# Patient Record
Sex: MALE | Race: WHITE | HISPANIC OR LATINO | Employment: FULL TIME | URBAN - METROPOLITAN AREA
[De-identification: names, ages, dates, MRNs, and addresses within clinical notes are randomized per-mention and may not be internally consistent; named-entity substitution may affect disease eponyms.]

---

## 2018-09-09 ENCOUNTER — OFFICE VISIT (OUTPATIENT)
Dept: URGENT CARE | Facility: CLINIC | Age: 43
End: 2018-09-09

## 2018-09-09 VITALS
BODY MASS INDEX: 30.16 KG/M2 | WEIGHT: 203.6 LBS | RESPIRATION RATE: 18 BRPM | SYSTOLIC BLOOD PRESSURE: 144 MMHG | OXYGEN SATURATION: 99 % | HEART RATE: 74 BPM | DIASTOLIC BLOOD PRESSURE: 98 MMHG | TEMPERATURE: 98.1 F | HEIGHT: 69 IN

## 2018-09-09 DIAGNOSIS — S01.512A LACERATION OF MOUTH, INITIAL ENCOUNTER: ICD-10-CM

## 2018-09-09 DIAGNOSIS — S09.93XA BLUNT TRAUMA OF FACE, INITIAL ENCOUNTER: ICD-10-CM

## 2018-09-09 DIAGNOSIS — S01.81XA CHIN LACERATION, INITIAL ENCOUNTER: ICD-10-CM

## 2018-09-09 DIAGNOSIS — I10 ESSENTIAL HYPERTENSION: ICD-10-CM

## 2018-09-09 PROCEDURE — 12013 RPR F/E/E/N/L/M 2.6-5.0 CM: CPT | Performed by: FAMILY MEDICINE

## 2018-09-09 PROCEDURE — 99202 OFFICE O/P NEW SF 15 MIN: CPT | Mod: 25 | Performed by: FAMILY MEDICINE

## 2018-09-09 RX ORDER — AMOXICILLIN AND CLAVULANATE POTASSIUM 875; 125 MG/1; MG/1
1 TABLET, FILM COATED ORAL 2 TIMES DAILY
Qty: 20 TAB | Refills: 0 | Status: SHIPPED | OUTPATIENT
Start: 2018-09-09 | End: 2018-09-19

## 2018-09-10 ASSESSMENT — ENCOUNTER SYMPTOMS
DOUBLE VISION: 0
BLURRED VISION: 0
DIZZINESS: 0
COUGH: 0
HEADACHES: 0

## 2018-09-10 NOTE — PATIENT INSTRUCTIONS
Wound Care Instructions    * After 24hrs leaving a wound uncovered helps it stay dry and heal.  If the wound isn't in an area that will get dirty or be rubbed by clothing you don't have to cover it.  If you do need to cover it do so with either an adhesive strip (band-aid) or sterile gauze and adhesive tape.  Change the dressing each day to keep the wound clean and dry.    * If you have steri strips (butterfly band-aids) placed, you need to keep those on until they fall off themselves.    * Antibiotic ointments such as Bacitracin or Neosporin may help keep the wound clean and help with scarring.  But DO NOT use these type of ointments with Dermabond as they interfere with wound healing.     * Keep wound dry for 5-7 days so the stitches or Dermabond have time to set .    * Return to the clinic in 7-10 days for stitch removal.    * You may be prescribed oral antibiotics if risk of infection is very high (such as your wound occurred over 12hrs before closure or you are a diabetic)    * You should have a current tetanus vaccination within the past ten years.    * Call your doctor if any of the following things occur as they may be signs of infection...    **the wound opens    **the wound drains pus    **red streaks develop from near the wound    **you have a fever over 101    **the wound site becomes tender or inflamed    AFTERCARE --     Some patients (eg, those with through and through lacerations or risk factors for infection) should undergo reevaluation 48 to 72 hours after repair to ensure proper healing. Patients capable of self-assessment might not need physician evaluation at this time.   Patients with intraoral lip lacerations should be advised as follows:  ?Eat soft foods for two to three days.   ?Rinse the mouth with water after eating.   ?Avoid spicy or salty foods until the wound is healed.  ?Avoid the use of straws (negative pressure may increase ecchymosis or bleeding at the wound site).

## 2018-09-10 NOTE — PROGRESS NOTES
"Subjective:      Emeka Story is a 42 y.o. male who presents with Laceration (x today, laceration on chin)    Patient presents to urgent care with an acute onset of a new problem.  He was playing golf today when he was hit in the face by a golf ball and feels that his tooth punctured the inside of his lip and gum region there is a laceration on the front as well.  There is minimal bleeding no loss of consciousness he can move his jaw without difficulty.  Denies that he feels his teeth are loose.  He feels that his tetanus is not up-to-date.    Patient has a chronic and stable condition of essential hypertension for which he has been on Lopressor his blood pressure has been stable denies any chest pain or shortness of breath.      HPI  Review of Systems   Eyes: Negative for blurred vision and double vision.   Respiratory: Negative for cough.    Neurological: Negative for dizziness and headaches.   All other systems reviewed and are negative.    PMH:  has no past medical history on file.  MEDS:   Current Outpatient Prescriptions:   •  amoxicillin-clavulanate (AUGMENTIN) 875-125 MG Tab, Take 1 Tab by mouth 2 times a day for 10 days., Disp: 20 Tab, Rfl: 0  •  metoprolol (LOPRESSOR) 25 MG TABS, Take 1 Tab by mouth 2 times a day., Disp: 60 Tab, Rfl: 0  •  metoprolol (LOPRESSOR) 25 MG TABS, Take 1 Tab by mouth 2 times a day., Disp: 60 Tab, Rfl: 0  ALLERGIES:   Allergies   Allergen Reactions   • Other Food      bananas     SURGHX:   Past Surgical History:   Procedure Laterality Date   • OTHER ORTHOPEDIC SURGERY       SOCHX:  reports that he has quit smoking. His smoking use included Cigarettes. He quit after 5.00 years of use. He has never used smokeless tobacco. He reports that he drinks alcohol. He reports that he does not use drugs.  FH: Family history was reviewed, no pertinent findings to report     Objective:     /98   Pulse 74   Temp 36.7 °C (98.1 °F)   Resp 18   Ht 1.753 m (5' 9\")   Wt 92.4 kg (203 " lb 9.6 oz)   SpO2 99%   BMI 30.07 kg/m²      Physical Exam   Constitutional: He is oriented to person, place, and time. He appears well-developed and well-nourished. No distress.   HENT:   Head:       2.8cm laceration to face not involving the vermillion border.  There is a smaller laceration on the inner aspect of his gum.     Eyes: Conjunctivae are normal.   Neck: Normal range of motion.   Cardiovascular: Normal rate.    Pulmonary/Chest: Effort normal.   Musculoskeletal: Normal range of motion.   Neurological: He is alert and oriented to person, place, and time.   Skin: Skin is warm and dry. He is not diaphoretic.   Psychiatric: He has a normal mood and affect. His behavior is normal.     Procedures: Area was prepped and cleaned no evidence of any foreign body 2 mL of Xylocaine with epinephrine was injected for local anesthesia after which 3 interrupted sutures 5-0 on a P3 were placed with good approximation minimal blood loss patient tolerated well.         Assessment/Plan:     1. Chin laceration, initial encounter  amoxicillin-clavulanate (AUGMENTIN) 875-125 MG Tab   2. Blunt trauma of face, initial encounter     3. Laceration of mouth, initial encounter     4. Essential hypertension       Some patients (eg, those with through and through lacerations or risk factors for infection) should undergo reevaluation 48 to 72 hours after repair to ensure proper healing. Patients capable of self-assessment might not need physician evaluation at this time.   Patients with intraoral lip lacerations should be advised as follows:  ?Eat soft foods for two to three days.   ?Rinse the mouth with water after eating.   ?Avoid spicy or salty foods until the wound is healed.  ?Avoid the use of straws (negative pressure may increase ecchymosis or bleeding at the wound site).    Patient given the precautions above I have told him that I do not close that inner lip here in the urgent care he would rather just avoid that for now and  see how the wound heals is placed on Augmentin prophylactically will return to clinic in another 48 hours for recheck    Chronic conditions of hypertension that may potentially impact the patient's ability to recover from this condition appear stable. The patient will f/u with their pcp and continue with current chronic medications as directed.

## 2020-06-03 ENCOUNTER — HOSPITAL ENCOUNTER (OUTPATIENT)
Dept: LAB | Facility: MEDICAL CENTER | Age: 45
End: 2020-06-03
Attending: SPECIALIST
Payer: COMMERCIAL

## 2020-06-03 PROCEDURE — 36415 COLL VENOUS BLD VENIPUNCTURE: CPT

## 2020-06-03 PROCEDURE — 86769 SARS-COV-2 COVID-19 ANTIBODY: CPT

## 2020-06-04 LAB — SARS-COV-2 IGG+IGM SERPL QL IA: NORMAL

## 2020-10-08 ENCOUNTER — OFFICE VISIT (OUTPATIENT)
Dept: MEDICAL GROUP | Facility: MEDICAL CENTER | Age: 45
End: 2020-10-08
Payer: COMMERCIAL

## 2020-10-08 VITALS
DIASTOLIC BLOOD PRESSURE: 96 MMHG | BODY MASS INDEX: 29.48 KG/M2 | HEIGHT: 70 IN | OXYGEN SATURATION: 93 % | WEIGHT: 205.91 LBS | HEART RATE: 72 BPM | TEMPERATURE: 97.6 F | SYSTOLIC BLOOD PRESSURE: 132 MMHG | RESPIRATION RATE: 16 BRPM

## 2020-10-08 DIAGNOSIS — Z00.00 ENCOUNTER FOR PREVENTIVE CARE: ICD-10-CM

## 2020-10-08 DIAGNOSIS — Z23 NEED FOR VACCINATION: ICD-10-CM

## 2020-10-08 DIAGNOSIS — K59.00 CONSTIPATION, UNSPECIFIED CONSTIPATION TYPE: ICD-10-CM

## 2020-10-08 DIAGNOSIS — I10 ESSENTIAL HYPERTENSION: ICD-10-CM

## 2020-10-08 DIAGNOSIS — Z13.1 DIABETES MELLITUS SCREENING: ICD-10-CM

## 2020-10-08 DIAGNOSIS — T78.40XA ALLERGY, INITIAL ENCOUNTER: ICD-10-CM

## 2020-10-08 PROBLEM — K52.9 CHRONIC DIARRHEA: Status: ACTIVE | Noted: 2020-10-08

## 2020-10-08 PROBLEM — K52.9 CHRONIC DIARRHEA: Status: RESOLVED | Noted: 2020-10-08 | Resolved: 2020-10-08

## 2020-10-08 PROCEDURE — 99204 OFFICE O/P NEW MOD 45 MIN: CPT | Mod: 25 | Performed by: INTERNAL MEDICINE

## 2020-10-08 PROCEDURE — 90686 IIV4 VACC NO PRSV 0.5 ML IM: CPT | Performed by: INTERNAL MEDICINE

## 2020-10-08 PROCEDURE — 90471 IMMUNIZATION ADMIN: CPT | Performed by: INTERNAL MEDICINE

## 2020-10-08 RX ORDER — LISINOPRIL 5 MG/1
5 TABLET ORAL DAILY
Qty: 30 TAB | Refills: 1 | Status: SHIPPED | OUTPATIENT
Start: 2020-10-08 | End: 2020-11-24 | Stop reason: SDUPTHER

## 2020-10-08 ASSESSMENT — PATIENT HEALTH QUESTIONNAIRE - PHQ9: CLINICAL INTERPRETATION OF PHQ2 SCORE: 0

## 2020-10-08 NOTE — PROGRESS NOTES
New Patient to Establish    Reason to establish: New patient to establish    Emeka Story is a 44 y.o. male who presents today with the following:    CC:   Chief Complaint   Patient presents with   • Establish Care   • GI Problem     x 9 mos., worsened last 2 mos., conspitation   • Hypertension     family history, also diabetes       HPI:     Allergy  He thinks he may have food allergies, he would like get them tested.   Worse with dairy products, he will try lactase or lactose free diet      Constipation  Constipation for 3-4 months, bristol stool scale 3-4 more in the evening.    2 week ago had diarrhea, now resolved.   Recommend adequate hydration, high fiber diet, dietary fibers, squatty potty and miralax prn.   Denies fever, chills, nausea, vomiting, heartburn, abdominal pain.         Essential hypertension  Has hx of arterial hypertension.    Initiate lisinopril  Benefits, risks, and adverse reactions of medication discussed. Patient is agreeable with initiating the medication.    DASH diet  Monitor BP  Denies any chest pain, shortness of breath, leg swelling, lightheadedness, dizziness.            Current Outpatient Medications:   •  lisinopril (PRINIVIL) 5 MG Tab, Take 1 Tab by mouth every day., Disp: 30 Tab, Rfl: 1    Allergies, past medical history, past surgical history, medications, family history, social history reviewed and updated.    ROS     Constitutional: Denies fevers or chills  Eyes: Denies changes in vision  Ears/Nose/Throat/Mouth: Denies nasal congestion or sore throat   Cardiovascular: Denies chest pain or palpitations   Respiratory: Denies shortness of breath , Denies cough  Gastrointestinal/Hepatic: Denies abd pain, nausea, vomiting   Genitourinary: Denies dysuria or frequency  Musculoskeletal/Rheum: Denies joint pain and swelling   Neurological: Denies headache  Psychiatric: Denies mood disorder   Endocrine: Denies hx of diabetes or thyroid dysfunction  Heme/Oncology/Lymph Nodes:  "Denies weight changes or enlarged LNs.    Physical Exam  /96 (BP Location: Left arm, Patient Position: Sitting, BP Cuff Size: Adult)   Pulse 72   Temp 36.4 °C (97.6 °F) (Temporal)   Resp 16   Ht 1.78 m (5' 10.08\")   Wt 93.4 kg (205 lb 14.6 oz)   SpO2 93%   BMI 29.48 kg/m²   General: Normal appearance.  Well developed, well nourished, no acute distress.  HEENT: Normocephalic.  Extraocular motion intact. Pupils are equally round, reactive to light and accommodation, conjunctiva clear, no scleral icterus.  Ears: normal shape and contour, ear canals clear, tympanic membranes intact. Hearing intact.  Oropharynx clear, no erythema, edema or exudate noted.  NECK: Thyroid is not enlarged. No JVD.  No carotid bruits. No masses.  Cardiovascular: Regular rhythm and rate. No murmur/rubs/gallops.   Respiratory: Normal respiratory effort, clear to auscultation bilaterally. No wheezing/rales/rhonchi.    Abdomen: Bowel sounds present, soft, nontender, nondistended, no rebound, no guarding. No hepatosplenomegaly.  : No suprapubic tenderness. No CVA tenderness.   EXT: no LE edema b/l. No cyanosis.  No clubbing.  Lymph: No cervical, supraclavicular or axillary lymph nodes are palpable  Skin: Warm and dry.  No suspicious lesions or rashes.   Neurologic: No focal deficits.    Psych: AAOx3,  Normal mood and affect, normal judgment and insight, memory within normal limits    Assessment and Plan    1. Essential hypertension  - lisinopril (PRINIVIL) 5 MG Tab; Take 1 Tab by mouth every day.  Dispense: 30 Tab; Refill: 1  - CBC WITH DIFFERENTIAL; Future  - Comp Metabolic Panel; Future  - Lipid Profile; Future  - TSH WITH REFLEX TO FT4; Future  - URINALYSIS,CULTURE IF INDICATED; Future    2. Constipation, unspecified constipation type  - TSH WITH REFLEX TO FT4; Future    3. Allergy, initial encounter  - REFERRAL TO ALLERGY    4. Diabetes mellitus screening  - Comp Metabolic Panel; Future    5. Encounter for preventive care  - " VITAMIN D,25 HYDROXY; Future    6. Need for vaccination  - Influenza Vaccine Quad Injection (PF)        Follow-up:Return in about 1 month (around 11/8/2020), or if symptoms worsen or fail to improve.    This note was created using voice recognition software. There may be unintended errors in spelling, grammar or content.

## 2020-10-08 NOTE — ASSESSMENT & PLAN NOTE
Has hx of arterial hypertension.    Initiate lisinopril  Benefits, risks, and adverse reactions of medication discussed. Patient is agreeable with initiating the medication.    DASH diet  Monitor BP  Denies any chest pain, shortness of breath, leg swelling, lightheadedness, dizziness.

## 2020-10-08 NOTE — ASSESSMENT & PLAN NOTE
He thinks he may have food allergies, he would like get them tested.   Worse with dairy products, he will try lactase or lactose free diet

## 2020-10-08 NOTE — ASSESSMENT & PLAN NOTE
Constipation for 3-4 months, bristol stool scale 3-4 more in the evening.    2 week ago had diarrhea, now resolved.   Recommend adequate hydration, high fiber diet, dietary fibers, squatty potty and miralax prn.   Denies fever, chills, nausea, vomiting, heartburn, abdominal pain.

## 2020-10-08 NOTE — PATIENT INSTRUCTIONS
"    DASH Eating Plan  DASH stands for \"Dietary Approaches to Stop Hypertension.\" The DASH eating plan is a healthy eating plan that has been shown to reduce high blood pressure (hypertension). It may also reduce your risk for type 2 diabetes, heart disease, and stroke. The DASH eating plan may also help with weight loss.  What are tips for following this plan?    General guidelines  · Avoid eating more than 2,300 mg (milligrams) of salt (sodium) a day. If you have hypertension, you may need to reduce your sodium intake to 1,500 mg a day.  · Limit alcohol intake to no more than 1 drink a day for nonpregnant women and 2 drinks a day for men. One drink equals 12 oz of beer, 5 oz of wine, or 1½ oz of hard liquor.  · Work with your health care provider to maintain a healthy body weight or to lose weight. Ask what an ideal weight is for you.  · Get at least 30 minutes of exercise that causes your heart to beat faster (aerobic exercise) most days of the week. Activities may include walking, swimming, or biking.  · Work with your health care provider or diet and nutrition specialist (dietitian) to adjust your eating plan to your individual calorie needs.  Reading food labels    · Check food labels for the amount of sodium per serving. Choose foods with less than 5 percent of the Daily Value of sodium. Generally, foods with less than 300 mg of sodium per serving fit into this eating plan.  · To find whole grains, look for the word \"whole\" as the first word in the ingredient list.  Shopping  · Buy products labeled as \"low-sodium\" or \"no salt added.\"  · Buy fresh foods. Avoid canned foods and premade or frozen meals.  Cooking  · Avoid adding salt when cooking. Use salt-free seasonings or herbs instead of table salt or sea salt. Check with your health care provider or pharmacist before using salt substitutes.  · Do not bella foods. Cook foods using healthy methods such as baking, boiling, grilling, and broiling instead.  · Cook " with heart-healthy oils, such as olive, canola, soybean, or sunflower oil.  Meal planning  · Eat a balanced diet that includes:  ? 5 or more servings of fruits and vegetables each day. At each meal, try to fill half of your plate with fruits and vegetables.  ? Up to 6-8 servings of whole grains each day.  ? Less than 6 oz of lean meat, poultry, or fish each day. A 3-oz serving of meat is about the same size as a deck of cards. One egg equals 1 oz.  ? 2 servings of low-fat dairy each day.  ? A serving of nuts, seeds, or beans 5 times each week.  ? Heart-healthy fats. Healthy fats called Omega-3 fatty acids are found in foods such as flaxseeds and coldwater fish, like sardines, salmon, and mackerel.  · Limit how much you eat of the following:  ? Canned or prepackaged foods.  ? Food that is high in trans fat, such as fried foods.  ? Food that is high in saturated fat, such as fatty meat.  ? Sweets, desserts, sugary drinks, and other foods with added sugar.  ? Full-fat dairy products.  · Do not salt foods before eating.  · Try to eat at least 2 vegetarian meals each week.  · Eat more home-cooked food and less restaurant, buffet, and fast food.  · When eating at a restaurant, ask that your food be prepared with less salt or no salt, if possible.  What foods are recommended?  The items listed may not be a complete list. Talk with your dietitian about what dietary choices are best for you.  Grains  Whole-grain or whole-wheat bread. Whole-grain or whole-wheat pasta. Brown rice. Oatmeal. Quinoa. Bulgur. Whole-grain and low-sodium cereals. Adela bread. Low-fat, low-sodium crackers. Whole-wheat flour tortillas.  Vegetables  Fresh or frozen vegetables (raw, steamed, roasted, or grilled). Low-sodium or reduced-sodium tomato and vegetable juice. Low-sodium or reduced-sodium tomato sauce and tomato paste. Low-sodium or reduced-sodium canned vegetables.  Fruits  All fresh, dried, or frozen fruit. Canned fruit in natural juice  (without added sugar).  Meat and other protein foods  Skinless chicken or turkey. Ground chicken or turkey. Pork with fat trimmed off. Fish and seafood. Egg whites. Dried beans, peas, or lentils. Unsalted nuts, nut butters, and seeds. Unsalted canned beans. Lean cuts of beef with fat trimmed off. Low-sodium, lean deli meat.  Dairy  Low-fat (1%) or fat-free (skim) milk. Fat-free, low-fat, or reduced-fat cheeses. Nonfat, low-sodium ricotta or cottage cheese. Low-fat or nonfat yogurt. Low-fat, low-sodium cheese.  Fats and oils  Soft margarine without trans fats. Vegetable oil. Low-fat, reduced-fat, or light mayonnaise and salad dressings (reduced-sodium). Canola, safflower, olive, soybean, and sunflower oils. Avocado.  Seasoning and other foods  Herbs. Spices. Seasoning mixes without salt. Unsalted popcorn and pretzels. Fat-free sweets.  What foods are not recommended?  The items listed may not be a complete list. Talk with your dietitian about what dietary choices are best for you.  Grains  Baked goods made with fat, such as croissants, muffins, or some breads. Dry pasta or rice meal packs.  Vegetables  Creamed or fried vegetables. Vegetables in a cheese sauce. Regular canned vegetables (not low-sodium or reduced-sodium). Regular canned tomato sauce and paste (not low-sodium or reduced-sodium). Regular tomato and vegetable juice (not low-sodium or reduced-sodium). Pickles. Olives.  Fruits  Canned fruit in a light or heavy syrup. Fried fruit. Fruit in cream or butter sauce.  Meat and other protein foods  Fatty cuts of meat. Ribs. Fried meat. Choudhury. Sausage. Bologna and other processed lunch meats. Salami. Fatback. Hotdogs. Bratwurst. Salted nuts and seeds. Canned beans with added salt. Canned or smoked fish. Whole eggs or egg yolks. Chicken or turkey with skin.  Dairy  Whole or 2% milk, cream, and half-and-half. Whole or full-fat cream cheese. Whole-fat or sweetened yogurt. Full-fat cheese. Nondairy creamers. Whipped  toppings. Processed cheese and cheese spreads.  Fats and oils  Butter. Stick margarine. Lard. Shortening. Ghee. Choudhury fat. Tropical oils, such as coconut, palm kernel, or palm oil.  Seasoning and other foods  Salted popcorn and pretzels. Onion salt, garlic salt, seasoned salt, table salt, and sea salt. Worcestershire sauce. Tartar sauce. Barbecue sauce. Teriyaki sauce. Soy sauce, including reduced-sodium. Steak sauce. Canned and packaged gravies. Fish sauce. Oyster sauce. Cocktail sauce. Horseradish that you find on the shelf. Ketchup. Mustard. Meat flavorings and tenderizers. Bouillon cubes. Hot sauce and Tabasco sauce. Premade or packaged marinades. Premade or packaged taco seasonings. Relishes. Regular salad dressings.  Where to find more information:  · National Heart, [employer], and [employer] [place]: www.nhlbi.nih.gov  · American Heart Association: www.heart.org  Summary  · The DASH eating plan is a healthy eating plan that has been shown to reduce high blood pressure (hypertension). It may also reduce your risk for type 2 diabetes, heart disease, and stroke.  · With the DASH eating plan, you should limit salt (sodium) intake to 2,300 mg a day. If you have hypertension, you may need to reduce your sodium intake to 1,500 mg a day.  · When on the DASH eating plan, aim to eat more fresh fruits and vegetables, whole grains, lean proteins, low-fat dairy, and heart-healthy fats.  · Work with your health care provider or diet and nutrition specialist (dietitian) to adjust your eating plan to your individual calorie needs.  This information is not intended to replace advice given to you by your health care provider. Make sure you discuss any questions you have with your health care provider.  Document Released: 12/06/2012 Document Revised: 11/30/2018 Document Reviewed:  Hypertension, Adult  High blood pressure (hypertension) is when the force of blood pumping through the arteries is too strong. The arteries are the blood  "vessels that carry blood from the heart throughout the body. Hypertension forces the heart to work harder to pump blood and may cause arteries to become narrow or stiff. Untreated or uncontrolled hypertension can cause a heart attack, heart failure, a stroke, kidney disease, and other problems.  A blood pressure reading consists of a higher number over a lower number. Ideally, your blood pressure should be below 120/80. The first (\"top\") number is called the systolic pressure. It is a measure of the pressure in your arteries as your heart beats. The second (\"bottom\") number is called the diastolic pressure. It is a measure of the pressure in your arteries as the heart relaxes.  What are the causes?  The exact cause of this condition is not known. There are some conditions that result in or are related to high blood pressure.  What increases the risk?  Some risk factors for high blood pressure are under your control. The following factors may make you more likely to develop this condition:  · Smoking.  · Having type 2 diabetes mellitus, high cholesterol, or both.  · Not getting enough exercise or physical activity.  · Being overweight.  · Having too much fat, sugar, calories, or salt (sodium) in your diet.  · Drinking too much alcohol.  Some risk factors for high blood pressure may be difficult or impossible to change. Some of these factors include:  · Having chronic kidney disease.  · Having a family history of high blood pressure.  · Age. Risk increases with age.  · Race. You may be at higher risk if you are .  · Gender. Men are at higher risk than women before age 45. After age 65, women are at higher risk than men.  · Having obstructive sleep apnea.  · Stress.  What are the signs or symptoms?  High blood pressure may not cause symptoms. Very high blood pressure (hypertensive crisis) may cause:  · Headache.  · Anxiety.  · Shortness of breath.  · Nosebleed.  · Nausea and vomiting.  · Vision " changes.  · Severe chest pain.  · Seizures.  How is this diagnosed?  This condition is diagnosed by measuring your blood pressure while you are seated, with your arm resting on a flat surface, your legs uncrossed, and your feet flat on the floor. The cuff of the blood pressure monitor will be placed directly against the skin of your upper arm at the level of your heart. It should be measured at least twice using the same arm. Certain conditions can cause a difference in blood pressure between your right and left arms.  Certain factors can cause blood pressure readings to be lower or higher than normal for a short period of time:  · When your blood pressure is higher when you are in a health care provider's office than when you are at home, this is called white coat hypertension. Most people with this condition do not need medicines.  · When your blood pressure is higher at home than when you are in a health care provider's office, this is called masked hypertension. Most people with this condition may need medicines to control blood pressure.  If you have a high blood pressure reading during one visit or you have normal blood pressure with other risk factors, you may be asked to:  · Return on a different day to have your blood pressure checked again.  · Monitor your blood pressure at home for 1 week or longer.  If you are diagnosed with hypertension, you may have other blood or imaging tests to help your health care provider understand your overall risk for other conditions.  How is this treated?  This condition is treated by making healthy lifestyle changes, such as eating healthy foods, exercising more, and reducing your alcohol intake. Your health care provider may prescribe medicine if lifestyle changes are not enough to get your blood pressure under control, and if:  · Your systolic blood pressure is above 130.  · Your diastolic blood pressure is above 80.  Your personal target blood pressure may vary depending  on your medical conditions, your age, and other factors.  Follow these instructions at home:  Eating and drinking    · Eat a diet that is high in fiber and potassium, and low in sodium, added sugar, and fat. An example eating plan is called the DASH (Dietary Approaches to Stop Hypertension) diet. To eat this way:  ? Eat plenty of fresh fruits and vegetables. Try to fill one half of your plate at each meal with fruits and vegetables.  ? Eat whole grains, such as whole-wheat pasta, brown rice, or whole-grain bread. Fill about one fourth of your plate with whole grains.  ? Eat or drink low-fat dairy products, such as skim milk or low-fat yogurt.  ? Avoid fatty cuts of meat, processed or cured meats, and poultry with skin. Fill about one fourth of your plate with lean proteins, such as fish, chicken without skin, beans, eggs, or tofu.  ? Avoid pre-made and processed foods. These tend to be higher in sodium, added sugar, and fat.  · Reduce your daily sodium intake. Most people with hypertension should eat less than 1,500 mg of sodium a day.  · Do not drink alcohol if:  ? Your health care provider tells you not to drink.  ? You are pregnant, may be pregnant, or are planning to become pregnant.  · If you drink alcohol:  ? Limit how much you use to:  § 0-1 drink a day for women.  § 0-2 drinks a day for men.  ? Be aware of how much alcohol is in your drink. In the [place] one drink equals one 12 oz bottle of beer (355 mL), one 5 oz glass of wine (148 mL), or one 1½ oz glass of hard liquor (44 mL).  Lifestyle    · Work with your health care provider to maintain a healthy body weight or to lose weight. Ask what an ideal weight is for you.  · Get at least 30 minutes of exercise most days of the week. Activities may include walking, swimming, or biking.  · Include exercise to strengthen your muscles (resistance exercise), such as Pilates or lifting weights, as part of your weekly exercise routine. Try to do these types of  exercises for 30 minutes at least 3 days a week.  · Do not use any products that contain nicotine or tobacco, such as cigarettes, e-cigarettes, and chewing tobacco. If you need help quitting, ask your health care provider.  · Monitor your blood pressure at home as told by your health care provider.  · Keep all follow-up visits as told by your health care provider. This is important.  Medicines  · Take over-the-counter and prescription medicines only as told by your health care provider. Follow directions carefully. Blood pressure medicines must be taken as prescribed.  · Do not skip doses of blood pressure medicine. Doing this puts you at risk for problems and can make the medicine less effective.  · Ask your health care provider about side effects or reactions to medicines that you should watch for.  Contact a health care provider if you:  · Think you are having a reaction to a medicine you are taking.  · Have headaches that keep coming back (recurring).  · Feel dizzy.  · Have swelling in your ankles.  · Have trouble with your vision.  Get help right away if you:  · Develop a severe headache or confusion.  · Have unusual weakness or numbness.  · Feel faint.  · Have severe pain in your chest or abdomen.  · Vomit repeatedly.  · Have trouble breathing.  Summary  · Hypertension is when the force of blood pumping through your arteries is too strong. If this condition is not controlled, it may put you at risk for serious complications.  · Your personal target blood pressure may vary depending on your medical conditions, your age, and other factors. For most people, a normal blood pressure is less than 120/80.  · Hypertension is treated with lifestyle changes, medicines, or a combination of both. Lifestyle changes include losing weight, eating a healthy, low-sodium diet, exercising more, and limiting alcohol.  This information is not intended to replace advice given to you by your health care provider. Make sure you  discuss any questions you have with your health care provider.  Document Released: 12/18/2006 Document Revised: 08/28/2019 Document Reviewed: 08/28/2019  Elsevier Patient Education © 2020 [employer] Inc[employer]   12/11/2017  Elsevier Patient Education © 2020 [employer]

## 2020-11-02 ENCOUNTER — HOSPITAL ENCOUNTER (OUTPATIENT)
Dept: LAB | Facility: MEDICAL CENTER | Age: 45
End: 2020-11-02
Payer: COMMERCIAL

## 2020-11-02 LAB
COVID ORDER STATUS COVID19: NORMAL
SARS-COV-2 RNA RESP QL NAA+PROBE: NOTDETECTED
SPECIMEN SOURCE: NORMAL

## 2020-11-09 ENCOUNTER — HOSPITAL ENCOUNTER (OUTPATIENT)
Dept: LAB | Facility: MEDICAL CENTER | Age: 45
End: 2020-11-09
Payer: COMMERCIAL

## 2020-11-12 ENCOUNTER — APPOINTMENT (OUTPATIENT)
Dept: MEDICAL GROUP | Facility: MEDICAL CENTER | Age: 45
End: 2020-11-12
Payer: COMMERCIAL

## 2020-11-24 DIAGNOSIS — I10 ESSENTIAL HYPERTENSION: ICD-10-CM

## 2020-11-24 RX ORDER — LISINOPRIL 5 MG/1
5 TABLET ORAL DAILY
Qty: 30 TAB | Refills: 1 | Status: SHIPPED | OUTPATIENT
Start: 2020-11-24 | End: 2022-10-11 | Stop reason: SDUPTHER

## 2020-11-24 NOTE — TELEPHONE ENCOUNTER
Received request via: Patient    Was the patient seen in the last year in this department? Yes    Does the patient have an active prescription (recently filled or refills available) for medication(s) requested? Yes, runs through 12/6

## 2020-11-24 NOTE — TELEPHONE ENCOUNTER
Called patient to ask if he had done labs. Patient had not completed them yet, so we decided to reschedule his appt, and I submitted a refill request to Dr. Sanchez for Lisinopril. Called patient a second time to inform him Dr. Sanchez had refilled his medication, and to ask him to schedule once he had completed his labs. Patient understood.

## 2020-12-10 ENCOUNTER — HOSPITAL ENCOUNTER (OUTPATIENT)
Dept: LAB | Facility: MEDICAL CENTER | Age: 45
End: 2020-12-10
Payer: COMMERCIAL

## 2020-12-10 LAB
COVID ORDER STATUS COVID19: NORMAL
SARS-COV-2 RNA RESP QL NAA+PROBE: DETECTED
SPECIMEN SOURCE: ABNORMAL

## 2020-12-17 ENCOUNTER — HOSPITAL ENCOUNTER (OUTPATIENT)
Dept: LAB | Facility: MEDICAL CENTER | Age: 45
End: 2020-12-17
Payer: COMMERCIAL

## 2020-12-17 LAB — COVID ORDER STATUS COVID19: NORMAL

## 2020-12-18 LAB
SARS-COV-2 RNA RESP QL NAA+PROBE: NOTDETECTED
SPECIMEN SOURCE: NORMAL

## 2021-11-19 ENCOUNTER — HOSPITAL ENCOUNTER (OUTPATIENT)
Facility: MEDICAL CENTER | Age: 46
End: 2021-11-19
Attending: NURSE PRACTITIONER
Payer: COMMERCIAL

## 2021-11-19 ENCOUNTER — OFFICE VISIT (OUTPATIENT)
Dept: URGENT CARE | Facility: CLINIC | Age: 46
End: 2021-11-19
Payer: COMMERCIAL

## 2021-11-19 VITALS
BODY MASS INDEX: 32.76 KG/M2 | SYSTOLIC BLOOD PRESSURE: 154 MMHG | OXYGEN SATURATION: 96 % | HEART RATE: 86 BPM | HEIGHT: 69 IN | RESPIRATION RATE: 12 BRPM | DIASTOLIC BLOOD PRESSURE: 90 MMHG | WEIGHT: 221.2 LBS | TEMPERATURE: 98.6 F

## 2021-11-19 DIAGNOSIS — J06.9 URI, ACUTE: ICD-10-CM

## 2021-11-19 LAB
EXTERNAL QUALITY CONTROL: NORMAL
SARS-COV+SARS-COV-2 AG RESP QL IA.RAPID: NEGATIVE

## 2021-11-19 PROCEDURE — U0005 INFEC AGEN DETEC AMPLI PROBE: HCPCS

## 2021-11-19 PROCEDURE — U0003 INFECTIOUS AGENT DETECTION BY NUCLEIC ACID (DNA OR RNA); SEVERE ACUTE RESPIRATORY SYNDROME CORONAVIRUS 2 (SARS-COV-2) (CORONAVIRUS DISEASE [COVID-19]), AMPLIFIED PROBE TECHNIQUE, MAKING USE OF HIGH THROUGHPUT TECHNOLOGIES AS DESCRIBED BY CMS-2020-01-R: HCPCS

## 2021-11-19 PROCEDURE — 87426 SARSCOV CORONAVIRUS AG IA: CPT | Mod: QW | Performed by: NURSE PRACTITIONER

## 2021-11-19 PROCEDURE — 99203 OFFICE O/P NEW LOW 30 MIN: CPT | Mod: CS | Performed by: NURSE PRACTITIONER

## 2021-11-19 RX ORDER — ALBUTEROL SULFATE 90 UG/1
2 AEROSOL, METERED RESPIRATORY (INHALATION) EVERY 6 HOURS PRN
Qty: 8.5 G | Refills: 0 | Status: SHIPPED
Start: 2021-11-19 | End: 2022-10-11

## 2021-11-19 RX ORDER — BENZONATATE 200 MG/1
200 CAPSULE ORAL 3 TIMES DAILY PRN
Qty: 60 CAPSULE | Refills: 0 | Status: SHIPPED
Start: 2021-11-19 | End: 2022-10-11

## 2021-11-19 ASSESSMENT — ENCOUNTER SYMPTOMS
FEVER: 0
COUGH: 1
CHILLS: 0

## 2021-11-19 NOTE — PROGRESS NOTES
Qamar Story is a 45 y.o. male who presents with Nasal Congestion (since Sunday, no recent Covid test) and Cough    Past Medical History:   Diagnosis Date   • Hypertension      Social History     Socioeconomic History   • Marital status:      Spouse name: Not on file   • Number of children: Not on file   • Years of education: Not on file   • Highest education level: Not on file   Occupational History   • Not on file   Tobacco Use   • Smoking status: Former Smoker     Years: 5.00     Types: Cigarettes   • Smokeless tobacco: Never Used   Vaping Use   • Vaping Use: Never used   Substance and Sexual Activity   • Alcohol use: Not Currently   • Drug use: No   • Sexual activity: Yes     Partners: Female   Other Topics Concern   • Not on file   Social History Narrative    2 boys     Social Determinants of Health     Financial Resource Strain:    • Difficulty of Paying Living Expenses: Not on file   Food Insecurity:    • Worried About Running Out of Food in the Last Year: Not on file   • Ran Out of Food in the Last Year: Not on file   Transportation Needs:    • Lack of Transportation (Medical): Not on file   • Lack of Transportation (Non-Medical): Not on file   Physical Activity:    • Days of Exercise per Week: Not on file   • Minutes of Exercise per Session: Not on file   Stress:    • Feeling of Stress : Not on file   Social Connections:    • Frequency of Communication with Friends and Family: Not on file   • Frequency of Social Gatherings with Friends and Family: Not on file   • Attends Mu-ism Services: Not on file   • Active Member of Clubs or Organizations: Not on file   • Attends Club or Organization Meetings: Not on file   • Marital Status: Not on file   Intimate Partner Violence:    • Fear of Current or Ex-Partner: Not on file   • Emotionally Abused: Not on file   • Physically Abused: Not on file   • Sexually Abused: Not on file   Housing Stability:    • Unable to Pay for Housing in the  "Last Year: Not on file   • Number of Places Lived in the Last Year: Not on file   • Unstable Housing in the Last Year: Not on file     Family History   Problem Relation Age of Onset   • Hypertension Mother    • Diabetes Mother    • Hypertension Sister    • Diabetes Sister    • Diabetes Paternal Grandfather    • Hypertension Paternal Grandfather    • Hypertension Father        Allergie: Other food    Patient is a 45-year-old male who presents with complaint of nasal congestion, cough, and intermittent wheezing.  Symptoms started over the last 7 days.  No fever, aches, or chills.          Cough  This is a new problem. The current episode started in the past 7 days. The problem has been unchanged. The problem occurs constantly. Pertinent negatives include no chills or fever. Nothing aggravates the symptoms. He has tried nothing for the symptoms. The treatment provided no relief.       Review of Systems   Constitutional: Negative for chills and fever.   HENT: Positive for congestion.    Respiratory: Positive for cough.    All other systems reviewed and are negative.             Objective     /90 (BP Location: Left arm, Patient Position: Sitting, BP Cuff Size: Adult)   Pulse 86   Temp 37 °C (98.6 °F) (Temporal)   Resp 12   Ht 1.753 m (5' 9\")   Wt 100 kg (221 lb 3.2 oz)   SpO2 96%   BMI 32.67 kg/m²      Physical Exam  Vitals reviewed.   Constitutional:       Appearance: Normal appearance.   HENT:      Head: Normocephalic and atraumatic.      Right Ear: Tympanic membrane, ear canal and external ear normal.      Left Ear: Tympanic membrane, ear canal and external ear normal.      Nose: Nose normal.      Mouth/Throat:      Mouth: Mucous membranes are moist.   Eyes:      Extraocular Movements: Extraocular movements intact.      Conjunctiva/sclera: Conjunctivae normal.      Pupils: Pupils are equal, round, and reactive to light.   Cardiovascular:      Rate and Rhythm: Normal rate and regular rhythm.      Heart " sounds: Normal heart sounds.   Pulmonary:      Effort: Pulmonary effort is normal.      Breath sounds: Normal breath sounds.   Musculoskeletal:         General: Normal range of motion.      Cervical back: Normal range of motion and neck supple.   Skin:     General: Skin is warm.   Neurological:      Mental Status: He is alert and oriented to person, place, and time.   Psychiatric:         Mood and Affect: Mood normal.         Behavior: Behavior normal.         Thought Content: Thought content normal.         Judgment: Judgment normal.             poct covid: negative                 Assessment & Plan   Upper respiratory infection    Tessalon as needed for cough  Albuterol as needed for wheezing and chest tightness   Follow up for persistent or worsening of symptoms  Tylenol/Motrin as needed       There are no diagnoses linked to this encounter.

## 2021-11-20 DIAGNOSIS — J06.9 URI, ACUTE: ICD-10-CM

## 2021-11-20 LAB — COVID ORDER STATUS COVID19: NORMAL

## 2021-11-21 LAB
SARS-COV-2 RNA RESP QL NAA+PROBE: NOTDETECTED
SPECIMEN SOURCE: NORMAL

## 2022-10-05 ENCOUNTER — TELEPHONE (OUTPATIENT)
Dept: MEDICAL GROUP | Facility: MEDICAL CENTER | Age: 47
End: 2022-10-05
Payer: COMMERCIAL

## 2022-10-06 PROBLEM — Z00.00 PREVENTATIVE HEALTH CARE: Status: ACTIVE | Noted: 2022-10-06

## 2022-10-07 ENCOUNTER — APPOINTMENT (OUTPATIENT)
Dept: MEDICAL GROUP | Facility: MEDICAL CENTER | Age: 47
End: 2022-10-07
Payer: COMMERCIAL

## 2022-10-07 NOTE — PROGRESS NOTES
Subjective:     CC:  The encounter diagnosis was Preventative health care.    HISTORY OF THE PRESENT ILLNESS: Patient is a 46 y.o. male. This pleasant patient is here today to establish care and discuss chronic conditions. His prior PCP was ***.    Problem   Preventative Health Care    Patient is here to establish care today.         Current Outpatient Medications Ordered in Epic   Medication Sig Dispense Refill    benzonatate (TESSALON) 200 MG capsule Take 1 Capsule by mouth 3 times a day as needed. 60 Capsule 0    albuterol 108 (90 Base) MCG/ACT Aero Soln inhalation aerosol Inhale 2 Puffs every 6 hours as needed for Shortness of Breath. 8.5 g 0    lisinopril (PRINIVIL) 5 MG Tab Take 1 Tab by mouth every day. 30 Tab 1     No current Epic-ordered facility-administered medications on file.     Social history  Living situation: ***  Occupation: ***  Marital status: ***  Alcohol/tobacco/illicit drugs: ***  Diet/Exercise: ***    Health Maintenance: reviewed  Vaccines:     ROS:   Gen: no fevers/chills, no changes in weight  Eyes: no changes in vision  ENT: no sore throat, no hearing loss, no bloody nose  Pulm: no sob, no cough  CV: no chest pain, no palpitations  GI: no nausea/vomiting, no diarrhea  : no dysuria  MSk: no myalgias  Skin: no rash  Neuro: no headaches, no numbness/tingling  Heme/Lymph: no easy bruising      Objective:     Exam: There were no vitals taken for this visit. There is no height or weight on file to calculate BMI.    General: Normal appearing. No distress.  HEENT: Normocephalic. Nasal mucosa benign, oropharynx is without erythema, edema or exudates.   Neck: Supple without JVD or bruit. Thyroid is not enlarged.  Pulmonary: Clear to ausculation.  Normal effort. No rales, ronchi, or wheezing.  Cardiovascular: Regular rate and rhythm without murmur. Carotid and radial pulses are intact and equal bilaterally.  Abdomen: Soft, nontender, nondistended. Normal bowel sounds.  Neurologic: Grossly  nonfocal  Skin: Warm and dry.  No obvious lesions.  Musculoskeletal: Normal gait. No extremity cyanosis, clubbing, or edema.  Psych: Normal mood and affect. Alert and oriented x3. Judgment and insight is normal.    Labs: No recent lab results available for review at this time    Assessment & Plan:   46 y.o. male with the following -    1. Preventative health care      No follow-ups on file.    Please note that this dictation was created using voice recognition software. I have made every reasonable attempt to correct obvious errors, but I expect that there are errors of grammar and possibly content that I did not discover before finalizing the note.

## 2022-10-11 ENCOUNTER — OFFICE VISIT (OUTPATIENT)
Dept: MEDICAL GROUP | Facility: MEDICAL CENTER | Age: 47
End: 2022-10-11
Payer: COMMERCIAL

## 2022-10-11 VITALS
DIASTOLIC BLOOD PRESSURE: 88 MMHG | WEIGHT: 216.05 LBS | TEMPERATURE: 98.7 F | HEIGHT: 69 IN | BODY MASS INDEX: 32 KG/M2 | OXYGEN SATURATION: 96 % | HEART RATE: 76 BPM | RESPIRATION RATE: 16 BRPM | SYSTOLIC BLOOD PRESSURE: 144 MMHG

## 2022-10-11 DIAGNOSIS — Z00.00 PREVENTATIVE HEALTH CARE: ICD-10-CM

## 2022-10-11 DIAGNOSIS — Z11.4 SCREENING FOR HIV (HUMAN IMMUNODEFICIENCY VIRUS): ICD-10-CM

## 2022-10-11 DIAGNOSIS — I10 ESSENTIAL HYPERTENSION: ICD-10-CM

## 2022-10-11 DIAGNOSIS — Z76.89 ENCOUNTER TO ESTABLISH CARE WITH NEW DOCTOR: ICD-10-CM

## 2022-10-11 DIAGNOSIS — Z11.59 NEED FOR HEPATITIS C SCREENING TEST: ICD-10-CM

## 2022-10-11 DIAGNOSIS — Z23 NEED FOR VACCINATION: ICD-10-CM

## 2022-10-11 PROCEDURE — 90686 IIV4 VACC NO PRSV 0.5 ML IM: CPT | Performed by: STUDENT IN AN ORGANIZED HEALTH CARE EDUCATION/TRAINING PROGRAM

## 2022-10-11 PROCEDURE — 99214 OFFICE O/P EST MOD 30 MIN: CPT | Mod: 25 | Performed by: STUDENT IN AN ORGANIZED HEALTH CARE EDUCATION/TRAINING PROGRAM

## 2022-10-11 PROCEDURE — 90471 IMMUNIZATION ADMIN: CPT | Performed by: STUDENT IN AN ORGANIZED HEALTH CARE EDUCATION/TRAINING PROGRAM

## 2022-10-11 RX ORDER — LISINOPRIL 5 MG/1
5 TABLET ORAL DAILY
Qty: 30 TABLET | Refills: 2 | Status: SHIPPED | OUTPATIENT
Start: 2022-10-11 | End: 2022-11-10

## 2022-10-11 ASSESSMENT — PATIENT HEALTH QUESTIONNAIRE - PHQ9: CLINICAL INTERPRETATION OF PHQ2 SCORE: 0

## 2022-10-11 NOTE — PROGRESS NOTES
Subjective:     CC:  Diagnoses of Essential hypertension, BMI 31.0-31.9,adult, Preventative health care, Need for vaccination, Need for hepatitis C screening test, Screening for HIV (human immunodeficiency virus), and Encounter to establish care with new doctor were pertinent to this visit.    HISTORY OF THE PRESENT ILLNESS: Patient is a 46 y.o. male. This pleasant patient is here today to establish care and discuss chronic conditions. His prior PCP was Dr. Sanchez.    Problem   Bmi 31.0-31.9,Adult    Chronic condition. He tries to eat healthy in general. He does regularly exercise with golfing.     Preventative Health Care    Patient is here to establish care today.    Health Maintenance: reviewed  Vaccines: due for flu, Tdap 11/2019, Pfizer COVID #3 received 05/2022     Essential Hypertension    Chronic condition. Has hx of arterial hypertension. He reports has been out of lisinopril for more than a year now.  Current regimen: none (was previously taking lisinopril 5mg daily)           Current Outpatient Medications Ordered in Epic   Medication Sig Dispense Refill    lisinopril (PRINIVIL) 5 MG Tab Take 1 Tablet by mouth every day for 30 days. 30 Tablet 2     No current Epic-ordered facility-administered medications on file.     Social history  Living situation: lives with family at home  Occupation: works as construction  Marital status:   Alcohol/tobacco/illicit drugs: never smoker, 3 drinks a week, denies illicit drugs    Health Maintenance: reviewed  Vaccines: due for flu, Tdap 11/2019, Pfizer COVID #3 received 05/2022    ROS:   Gen: no fevers/chills, no changes in weight  Eyes: no changes in vision  ENT: no sore throat  Pulm: no sob, no cough  CV: no chest pain, no palpitations  GI: no nausea/vomiting, no diarrhea  : no dysuria  MSk: no myalgias  Skin: no rash  Neuro: no headaches, no numbness/tingling      Objective:     Exam: BP (!) 144/88 (BP Location: Left arm, Patient Position: Sitting, BP Cuff Size:  "Adult)   Pulse 76   Temp 37.1 °C (98.7 °F) (Temporal)   Resp 16   Ht 1.753 m (5' 9\")   Wt 98 kg (216 lb 0.8 oz)   SpO2 96%  Body mass index is 31.91 kg/m².    General: Normal appearing. No distress.  HEENT: Normocephalic.   Neck: Supple without JVD or bruit. Thyroid is not enlarged.  Pulmonary: Clear to ausculation. Normal effort. No rales, ronchi, or wheezing.  Cardiovascular: Regular rate and rhythm without murmur. Carotid and radial pulses are intact and equal bilaterally.  Abdomen: Soft, nontender, nondistended. Normal bowel sounds.  Neurologic: Grossly nonfocal  Skin: Warm and dry.  No obvious lesions.  Musculoskeletal: Normal gait. No extremity cyanosis, clubbing, or edema.  Psych: Normal mood and affect. Alert and oriented x3. Judgment and insight is normal.    Labs: No recent lab results available for review at this time    Assessment & Plan:   46 y.o. male with the following -    1. Essential hypertension  Chronic condition, not well controlled. He has been out of medication for more than a year. Plan to restart lisinopril 5mg daily. Follow up in 4 weeks to reassess. Advised healthy diet/weight loss, regular exercise.  - lisinopril (PRINIVIL) 5 MG Tab; Take 1 Tablet by mouth every day for 30 days.  Dispense: 30 Tablet; Refill: 2  - Comp Metabolic Panel; Future    2. BMI 31.0-31.9,adult  - Patient identified as having weight management issue.  Appropriate orders and counseling given.  - Comp Metabolic Panel; Future  - HEMOGLOBIN A1C; Future  - Lipid Profile; Future  - TSH WITH REFLEX TO FT4; Future    3. Preventative health care  - Comp Metabolic Panel; Future  - HEMOGLOBIN A1C; Future  - HEP C VIRUS ANTIBODY; Future  - Lipid Profile; Future  - TSH WITH REFLEX TO FT4; Future  - HIV AG/AB COMBO ASSAY SCREENING; Future    4. Need for vaccination  - INFLUENZA VACCINE QUAD INJ (PF)    5. Need for hepatitis C screening test  - HEP C VIRUS ANTIBODY; Future    6. Screening for HIV (human immunodeficiency " virus)  - HIV AG/AB COMBO ASSAY SCREENING; Future    7. Encounter to establish care with new doctor      Return in about 4 weeks (around 11/8/2022) for lab results.    Please note that this dictation was created using voice recognition software. I have made every reasonable attempt to correct obvious errors, but I expect that there are errors of grammar and possibly content that I did not discover before finalizing the note.

## 2022-10-19 ENCOUNTER — HOSPITAL ENCOUNTER (OUTPATIENT)
Dept: LAB | Facility: MEDICAL CENTER | Age: 47
End: 2022-10-19
Attending: STUDENT IN AN ORGANIZED HEALTH CARE EDUCATION/TRAINING PROGRAM
Payer: COMMERCIAL

## 2022-10-19 DIAGNOSIS — Z11.59 NEED FOR HEPATITIS C SCREENING TEST: ICD-10-CM

## 2022-10-19 DIAGNOSIS — I10 ESSENTIAL HYPERTENSION: ICD-10-CM

## 2022-10-19 DIAGNOSIS — Z00.00 PREVENTATIVE HEALTH CARE: ICD-10-CM

## 2022-10-19 DIAGNOSIS — Z11.4 SCREENING FOR HIV (HUMAN IMMUNODEFICIENCY VIRUS): ICD-10-CM

## 2022-10-19 LAB
ALBUMIN SERPL BCP-MCNC: 4.4 G/DL (ref 3.2–4.9)
ALBUMIN/GLOB SERPL: 1.3 G/DL
ALP SERPL-CCNC: 72 U/L (ref 30–99)
ALT SERPL-CCNC: 19 U/L (ref 2–50)
ANION GAP SERPL CALC-SCNC: 11 MMOL/L (ref 7–16)
AST SERPL-CCNC: 20 U/L (ref 12–45)
BILIRUB SERPL-MCNC: 0.6 MG/DL (ref 0.1–1.5)
BUN SERPL-MCNC: 18 MG/DL (ref 8–22)
CALCIUM SERPL-MCNC: 9.1 MG/DL (ref 8.4–10.2)
CHLORIDE SERPL-SCNC: 104 MMOL/L (ref 96–112)
CHOLEST SERPL-MCNC: 238 MG/DL (ref 100–199)
CO2 SERPL-SCNC: 25 MMOL/L (ref 20–33)
CREAT SERPL-MCNC: 0.94 MG/DL (ref 0.5–1.4)
EST. AVERAGE GLUCOSE BLD GHB EST-MCNC: 120 MG/DL
FASTING STATUS PATIENT QL REPORTED: NORMAL
GFR SERPLBLD CREATININE-BSD FMLA CKD-EPI: 100 ML/MIN/1.73 M 2
GLOBULIN SER CALC-MCNC: 3.3 G/DL (ref 1.9–3.5)
GLUCOSE SERPL-MCNC: 100 MG/DL (ref 65–99)
HBA1C MFR BLD: 5.8 % (ref 4–5.6)
HCV AB SER QL: NORMAL
HDLC SERPL-MCNC: 49 MG/DL
HIV 1+2 AB+HIV1 P24 AG SERPL QL IA: NORMAL
LDLC SERPL CALC-MCNC: 150 MG/DL
POTASSIUM SERPL-SCNC: 4.6 MMOL/L (ref 3.6–5.5)
PROT SERPL-MCNC: 7.7 G/DL (ref 6–8.2)
SODIUM SERPL-SCNC: 140 MMOL/L (ref 135–145)
TRIGL SERPL-MCNC: 193 MG/DL (ref 0–149)
TSH SERPL DL<=0.005 MIU/L-ACNC: 1.47 UIU/ML (ref 0.38–5.33)

## 2022-10-19 PROCEDURE — 80053 COMPREHEN METABOLIC PANEL: CPT

## 2022-10-19 PROCEDURE — 36415 COLL VENOUS BLD VENIPUNCTURE: CPT

## 2022-10-19 PROCEDURE — 84443 ASSAY THYROID STIM HORMONE: CPT

## 2022-10-19 PROCEDURE — 80061 LIPID PANEL: CPT

## 2022-10-19 PROCEDURE — 83036 HEMOGLOBIN GLYCOSYLATED A1C: CPT

## 2022-10-19 PROCEDURE — 87389 HIV-1 AG W/HIV-1&-2 AB AG IA: CPT

## 2022-10-19 PROCEDURE — 86803 HEPATITIS C AB TEST: CPT

## 2022-11-08 ENCOUNTER — OFFICE VISIT (OUTPATIENT)
Dept: MEDICAL GROUP | Facility: MEDICAL CENTER | Age: 47
End: 2022-11-08
Payer: COMMERCIAL

## 2022-11-08 VITALS
WEIGHT: 216.05 LBS | TEMPERATURE: 98 F | HEIGHT: 69 IN | RESPIRATION RATE: 16 BRPM | OXYGEN SATURATION: 98 % | DIASTOLIC BLOOD PRESSURE: 88 MMHG | HEART RATE: 91 BPM | BODY MASS INDEX: 32 KG/M2 | SYSTOLIC BLOOD PRESSURE: 152 MMHG

## 2022-11-08 DIAGNOSIS — R73.03 PREDIABETES: ICD-10-CM

## 2022-11-08 DIAGNOSIS — E78.5 DYSLIPIDEMIA: ICD-10-CM

## 2022-11-08 DIAGNOSIS — I10 ESSENTIAL HYPERTENSION: ICD-10-CM

## 2022-11-08 PROCEDURE — 99214 OFFICE O/P EST MOD 30 MIN: CPT | Performed by: STUDENT IN AN ORGANIZED HEALTH CARE EDUCATION/TRAINING PROGRAM

## 2022-11-08 NOTE — PROGRESS NOTES
"Subjective:     CC: chronic conditions follow up    HPI:   Emeka presents today for chronic conditions follow up    Problem   Dyslipidemia    Chronic condition.     Prediabetes    New condition.     Bmi 31.0-31.9,Adult    Chronic condition. He tries to eat healthy in general. He does regularly exercise with golfing.     Essential Hypertension    Chronic condition. Has hx of arterial hypertension. He reports has been out of lisinopril for more than a year now.  Current regimen: lisinopril 5mg daily           Current Outpatient Medications Ordered in Epic   Medication Sig Dispense Refill    lisinopril (PRINIVIL) 5 MG Tab Take 1 Tablet by mouth every day for 30 days. 30 Tablet 2     No current Epic-ordered facility-administered medications on file.     Social history  Living situation: lives with family at home  Occupation: works as construction  Marital status:   Alcohol/tobacco/illicit drugs: never smoker, 3 drinks a week, denies illicit drugs     Health Maintenance: reviewed  Vaccines: due for flu, Tdap 11/2019, Pfizer COVID #3 received 05/2022     ROS:   Gen: no fevers/chills, no changes in weight  Eyes: no changes in vision  ENT: no sore throat  Pulm: no sob, no cough  CV: no chest pain, no palpitations  GI: no nausea/vomiting, no diarrhea  : no dysuria  MSk: no myalgias  Skin: no rash  Neuro: no headaches, no numbness/tingling    Objective:     Exam:  BP (!) 152/88 (BP Location: Left arm, Patient Position: Sitting, BP Cuff Size: Adult)   Pulse 91   Temp 36.7 °C (98 °F) (Temporal)   Resp 16   Ht 1.753 m (5' 9\")   Wt 98 kg (216 lb 0.8 oz)   SpO2 98%   BMI 31.91 kg/m²  Body mass index is 31.91 kg/m².    Gen: Alert and oriented, No apparent distress.  Neck: Neck is supple without lymphadenopathy.  Lungs: Normal effort, CTA bilaterally, no wheezes, rhonchi, or rales  CV: Regular rate and rhythm. No murmurs, rubs, or gallops.  Ext: No clubbing, cyanosis, edema.    Labs:   Lab Results   Component " Value Date/Time    SODIUM 140 10/19/2022 10:53 AM    POTASSIUM 4.6 10/19/2022 10:53 AM    CHLORIDE 104 10/19/2022 10:53 AM    CO2 25 10/19/2022 10:53 AM    ANION 11.0 10/19/2022 10:53 AM    GLUCOSE 100 (H) 10/19/2022 10:53 AM    BUN 18 10/19/2022 10:53 AM    CREATININE 0.94 10/19/2022 10:53 AM    CALCIUM 9.1 10/19/2022 10:53 AM    ASTSGOT 20 10/19/2022 10:53 AM    ALTSGPT 19 10/19/2022 10:53 AM    TBILIRUBIN 0.6 10/19/2022 10:53 AM    ALBUMIN 4.4 10/19/2022 10:53 AM    TOTPROTEIN 7.7 10/19/2022 10:53 AM    GLOBULIN 3.3 10/19/2022 10:53 AM    AGRATIO 1.3 10/19/2022 10:53 AM     Lab Results   Component Value Date/Time    HBA1C 5.8 (H) 10/19/2022 10:53 AM      Lab Results   Component Value Date/Time    CHOLSTRLTOT 238 (H) 10/19/2022 1053    TRIGLYCERIDE 193 (H) 10/19/2022 1053    HDL 49 10/19/2022 1053     (H) 10/19/2022 1053     Lab Results   Component Value Date/Time    TSHULTRASEN 1.470 10/19/2022 1053     Current 10-yr ASCVD risk score 5.4%    Assessment & Plan:     46 y.o. male with the following -     1. Essential hypertension  Chronic condition, unstable. Advised patient to monitor blood pressure at home and notify me of his readings over the weekend. We will adjust medication as needed.  - cont current regimen: lisinopril 5mg daily    2. Dyslipidemia  New condition. Current 10-yr ASCVD risk 5.4%.  - advised to maintain a healthy weight, regular aerobic exercise, and eating diets lower in saturated fats  - plan to recheck prior to next visit  - Lipid Profile; Future    3. Prediabetes  New condition.  - Advised healthy diet/weight loss, regular exercise    4. BMI 31.0-31.9,adult  - Advised healthy diet/weight loss, regular exercise    Return in about 6 months (around 5/8/2023) for chronic medical conditions.    Please note that this dictation was created using voice recognition software. I have made every reasonable attempt to correct obvious errors, but I expect that there are errors of grammar and  possibly content that I did not discover before finalizing the note.

## 2022-11-15 DIAGNOSIS — I10 ESSENTIAL HYPERTENSION: ICD-10-CM

## 2022-11-15 RX ORDER — LISINOPRIL 20 MG/1
20 TABLET ORAL 2 TIMES DAILY
Qty: 60 TABLET | Refills: 2 | Status: SHIPPED | OUTPATIENT
Start: 2022-11-15 | End: 2023-02-16

## 2022-11-20 DIAGNOSIS — I10 ESSENTIAL HYPERTENSION: ICD-10-CM

## 2022-11-20 RX ORDER — CHLORTHALIDONE 25 MG/1
12.5-25 TABLET ORAL DAILY
Qty: 30 TABLET | Refills: 2 | Status: SHIPPED | OUTPATIENT
Start: 2022-11-20 | End: 2022-12-20

## 2022-11-20 NOTE — PROGRESS NOTES
Rx for chlorthalidone sent. Blood pressure still not well controlled with lisinopril 20mg BID. Adding chlorthalidone, starting with 12.5mg daily, adjust based on response/tolerability.

## 2022-11-27 DIAGNOSIS — I10 ESSENTIAL HYPERTENSION: ICD-10-CM

## 2022-11-27 RX ORDER — AMLODIPINE BESYLATE 5 MG/1
5 TABLET ORAL DAILY
Qty: 30 TABLET | Refills: 2 | Status: SHIPPED | OUTPATIENT
Start: 2022-11-27 | End: 2022-12-27

## 2022-11-28 NOTE — PROGRESS NOTES
Rx for amlodipine sent. Blood pressure still not well controlled with lisinopril 20mg BID. Did a trial with chlorthalidone but patient felt sick from it. Starting amlodipine 5mg daily, dose adjust based on response/tolerability.

## 2023-05-08 NOTE — PROGRESS NOTES
"Subjective:     CC: chronic conditions follow up    HPI:   Emeka presents today for chronic conditions follow up    Problem   Dyslipidemia    Chronic condition. Stable with healthy lifestyle management.     Prediabetes    Chronic condition. Stable with healthy lifestyle management.     Bmi 31.0-31.9,Adult    Chronic condition. He tries to eat healthy in general. He does regularly exercise with golfing.     Preventative Health Care    Patient is due for annual labs.    Health Maintenance: reviewed  Vaccines: due for flu, Tdap 11/2019, Pfizer COVID #3 received 05/2022     Essential Hypertension    Chronic condition.   .  Current regimen: lisinopril 20mg BID           Current Outpatient Medications Ordered in Epic   Medication Sig Dispense Refill    amLODIPine (NORVASC) 5 MG Tab Take 1 Tablet by mouth every day for 90 days. 90 Tablet 3    lisinopril (PRINIVIL) 20 MG Tab TAKE ONE TABLET BY MOUTH TWICE A DAY FOR 30 DAYS 180 Tablet 3     No current Epic-ordered facility-administered medications on file.     Social history  Living situation: lives with family at home  Occupation: works as construction  Marital status:   Alcohol/tobacco/illicit drugs: never smoker, 3 drinks a week, denies illicit drugs     Health Maintenance: reviewed  Vaccines: Tdap 11/2019, Pfizer COVID #3 received 05/2022  Colonoscopy never done     ROS:   See HPI    Objective:     Exam:  BP (!) 144/92 (BP Location: Left arm, Patient Position: Sitting, BP Cuff Size: Adult)   Pulse 70   Temp 36.5 °C (97.7 °F)   Resp 14   Ht 1.753 m (5' 9\")   Wt 95.9 kg (211 lb 6.4 oz)   SpO2 95%   BMI 31.22 kg/m²  Body mass index is 31.22 kg/m².    Gen: Alert and oriented, No apparent distress.  Lungs: Normal effort, CTA bilaterally, no wheezes, rhonchi, or rales  CV: Regular rate and rhythm. No murmurs, rubs, or gallops.  Ext: No clubbing, cyanosis, edema.    Labs: no new lab results since last visit    Assessment & Plan:     47 y.o. male with the " following -     1. Essential hypertension  Chronic condition, sub-optimal control. Previously tried chlorthalidone and did not feel well with it.  - cont current regimen: lisinopril 20mg BID  - add amlodipine per order  - amLODIPine (NORVASC) 5 MG Tab; Take 1 Tablet by mouth every day for 90 days.  Dispense: 90 Tablet; Refill: 3  - lisinopril (PRINIVIL) 20 MG Tab; TAKE ONE TABLET BY MOUTH TWICE A DAY FOR 30 DAYS  Dispense: 180 Tablet; Refill: 3    2. Dyslipidemia  Chronic condition. Plan to reassess.  - Lipid Profile; Future  - TSH WITH REFLEX TO FT4; Future    3. Prediabetes  Chronic condition. Plan to reassess.  - HEMOGLOBIN A1C; Future    4. BMI 31.0-31.9,adult  - Comp Metabolic Panel; Future  - HEMOGLOBIN A1C; Future  - Lipid Profile; Future  - TSH WITH REFLEX TO FT4; Future  - Patient identified as having weight management issue.  Appropriate orders and counseling given.    5. Preventative health care  - Comp Metabolic Panel; Future  - HEMOGLOBIN A1C; Future  - Lipid Profile; Future  - TSH WITH REFLEX TO FT4; Future  - HEP B SURFACE AB; Future      Return in about 4 weeks (around 6/6/2023) for chronic medical conditions.    Please note that this dictation was created using voice recognition software. I have made every reasonable attempt to correct obvious errors, but I expect that there are errors of grammar and possibly content that I did not discover before finalizing the note.

## 2023-05-09 ENCOUNTER — OFFICE VISIT (OUTPATIENT)
Dept: MEDICAL GROUP | Facility: MEDICAL CENTER | Age: 48
End: 2023-05-09
Payer: COMMERCIAL

## 2023-05-09 VITALS
HEIGHT: 69 IN | OXYGEN SATURATION: 95 % | SYSTOLIC BLOOD PRESSURE: 144 MMHG | DIASTOLIC BLOOD PRESSURE: 92 MMHG | RESPIRATION RATE: 14 BRPM | BODY MASS INDEX: 31.31 KG/M2 | WEIGHT: 211.4 LBS | TEMPERATURE: 97.7 F | HEART RATE: 70 BPM

## 2023-05-09 DIAGNOSIS — E78.5 DYSLIPIDEMIA: ICD-10-CM

## 2023-05-09 DIAGNOSIS — R73.03 PREDIABETES: ICD-10-CM

## 2023-05-09 DIAGNOSIS — Z00.00 PREVENTATIVE HEALTH CARE: ICD-10-CM

## 2023-05-09 DIAGNOSIS — I10 ESSENTIAL HYPERTENSION: ICD-10-CM

## 2023-05-09 PROCEDURE — 99214 OFFICE O/P EST MOD 30 MIN: CPT | Performed by: STUDENT IN AN ORGANIZED HEALTH CARE EDUCATION/TRAINING PROGRAM

## 2023-05-09 RX ORDER — LISINOPRIL 20 MG/1
TABLET ORAL
Qty: 180 TABLET | Refills: 3 | Status: SHIPPED | OUTPATIENT
Start: 2023-05-09

## 2023-05-09 RX ORDER — AMLODIPINE BESYLATE 5 MG/1
5 TABLET ORAL DAILY
Qty: 90 TABLET | Refills: 3 | Status: SHIPPED | OUTPATIENT
Start: 2023-05-09 | End: 2023-08-07

## 2023-06-09 ENCOUNTER — OFFICE VISIT (OUTPATIENT)
Dept: MEDICAL GROUP | Facility: MEDICAL CENTER | Age: 48
End: 2023-06-09
Payer: COMMERCIAL

## 2023-06-09 VITALS
OXYGEN SATURATION: 94 % | BODY MASS INDEX: 30.69 KG/M2 | RESPIRATION RATE: 16 BRPM | HEIGHT: 69 IN | SYSTOLIC BLOOD PRESSURE: 148 MMHG | TEMPERATURE: 98 F | DIASTOLIC BLOOD PRESSURE: 82 MMHG | HEART RATE: 78 BPM | WEIGHT: 207.23 LBS

## 2023-06-09 DIAGNOSIS — E78.5 DYSLIPIDEMIA: ICD-10-CM

## 2023-06-09 DIAGNOSIS — R73.03 PREDIABETES: ICD-10-CM

## 2023-06-09 DIAGNOSIS — I10 ESSENTIAL HYPERTENSION: ICD-10-CM

## 2023-06-09 PROCEDURE — 3077F SYST BP >= 140 MM HG: CPT | Performed by: STUDENT IN AN ORGANIZED HEALTH CARE EDUCATION/TRAINING PROGRAM

## 2023-06-09 PROCEDURE — 99214 OFFICE O/P EST MOD 30 MIN: CPT | Performed by: STUDENT IN AN ORGANIZED HEALTH CARE EDUCATION/TRAINING PROGRAM

## 2023-06-09 PROCEDURE — 3079F DIAST BP 80-89 MM HG: CPT | Performed by: STUDENT IN AN ORGANIZED HEALTH CARE EDUCATION/TRAINING PROGRAM

## 2023-06-09 ASSESSMENT — PATIENT HEALTH QUESTIONNAIRE - PHQ9: CLINICAL INTERPRETATION OF PHQ2 SCORE: 0

## 2023-06-09 NOTE — PROGRESS NOTES
"Subjective:     CC: HTN follow up    HPI:   Emeka presents today for HTN follow up    Problem   Dyslipidemia    Chronic condition. Stable with healthy lifestyle management.     Prediabetes    Chronic condition. Stable with healthy lifestyle management.     Bmi 30.0-30.9,Adult    Chronic condition. He tries to eat healthy in general. He does regularly exercise with golfing.     Essential Hypertension    Chronic condition. Since last visit, amlodipine was added. He reports home blood pressure typically runs 140s/90s.  .  Current regimen: lisinopril 20mg BID, amlodipine 5mg daily         Current Outpatient Medications Ordered in Epic   Medication Sig Dispense Refill    amLODIPine (NORVASC) 5 MG Tab Take 1 Tablet by mouth every day for 90 days. 90 Tablet 3    lisinopril (PRINIVIL) 20 MG Tab TAKE ONE TABLET BY MOUTH TWICE A DAY FOR 30 DAYS 180 Tablet 3     No current Epic-ordered facility-administered medications on file.     Social history  Living situation: lives with family at home  Occupation: works as construction  Marital status:   Alcohol/tobacco/illicit drugs: never smoker, 3 drinks a week, denies illicit drugs    ROS:  See HPI    Objective:     Exam:  BP (!) 148/82   Pulse 78   Temp 36.7 °C (98 °F) (Temporal)   Resp 16   Ht 1.753 m (5' 9\")   Wt 94 kg (207 lb 3.7 oz)   SpO2 94%   BMI 30.60 kg/m²  Body mass index is 30.6 kg/m².    Gen: Alert and oriented, No apparent distress.  Neck: Neck is supple without lymphadenopathy.  Lungs: Normal effort, CTA bilaterally, no wheezes, rhonchi, or rales  CV: Regular rate and rhythm. No murmurs, rubs, or gallops.  Ext: No clubbing, cyanosis, edema.    Labs: no new lab results since last visit    Assessment & Plan:     47 y.o. male with the following -     1. Essential hypertension  Chronic condition, sub-optimal.  - cont current regimen: lisinopril 20mg BID  - increase amlodipine 5mg BID    2. Dyslipidemia  - Advised healthy diet/weight loss, regular " exercise    3. Prediabetes  - Advised healthy diet/weight loss, regular exercise    4. BMI 30.0-30.9,adult  - Advised healthy diet/weight loss, regular exercise    Return in about 3 months (around 9/9/2023) for chronic medical conditions.    Please note that this dictation was created using voice recognition software. I have made every reasonable attempt to correct obvious errors, but I expect that there are errors of grammar and possibly content that I did not discover before finalizing the note.

## 2023-08-30 ENCOUNTER — DOCUMENTATION (OUTPATIENT)
Dept: HEALTH INFORMATION MANAGEMENT | Facility: OTHER | Age: 48
End: 2023-08-30

## 2023-08-31 ENCOUNTER — TELEPHONE (OUTPATIENT)
Dept: HEALTH INFORMATION MANAGEMENT | Facility: OTHER | Age: 48
End: 2023-08-31

## 2023-09-08 ENCOUNTER — APPOINTMENT (OUTPATIENT)
Dept: MEDICAL GROUP | Facility: MEDICAL CENTER | Age: 48
End: 2023-09-08